# Patient Record
Sex: FEMALE | Employment: OTHER | URBAN - NONMETROPOLITAN AREA
[De-identification: names, ages, dates, MRNs, and addresses within clinical notes are randomized per-mention and may not be internally consistent; named-entity substitution may affect disease eponyms.]

---

## 2017-01-01 ENCOUNTER — MED REC SCAN ONLY (OUTPATIENT)
Dept: FAMILY MEDICINE CLINIC | Facility: CLINIC | Age: 82
End: 2017-01-01

## 2017-01-01 ENCOUNTER — TELEPHONE (OUTPATIENT)
Dept: FAMILY MEDICINE CLINIC | Facility: CLINIC | Age: 82
End: 2017-01-01

## 2017-01-01 ENCOUNTER — LABORATORY ENCOUNTER (OUTPATIENT)
Dept: LAB | Age: 82
End: 2017-01-01
Attending: FAMILY MEDICINE
Payer: MEDICARE

## 2017-01-01 ENCOUNTER — LAB REQUISITION (OUTPATIENT)
Dept: LAB | Facility: HOSPITAL | Age: 82
End: 2017-01-01
Payer: COMMERCIAL

## 2017-01-01 ENCOUNTER — LAB ENCOUNTER (OUTPATIENT)
Dept: LAB | Age: 82
End: 2017-01-01
Attending: FAMILY MEDICINE
Payer: MEDICARE

## 2017-01-01 ENCOUNTER — OFFICE VISIT (OUTPATIENT)
Dept: FAMILY MEDICINE CLINIC | Facility: CLINIC | Age: 82
End: 2017-01-01

## 2017-01-01 VITALS
WEIGHT: 88.25 LBS | HEART RATE: 100 BPM | BODY MASS INDEX: 16 KG/M2 | TEMPERATURE: 98 F | DIASTOLIC BLOOD PRESSURE: 60 MMHG | SYSTOLIC BLOOD PRESSURE: 130 MMHG | RESPIRATION RATE: 16 BRPM

## 2017-01-01 DIAGNOSIS — Z86.2 HISTORY OF ANEMIA: ICD-10-CM

## 2017-01-01 DIAGNOSIS — E87.6 HYPOKALEMIA: ICD-10-CM

## 2017-01-01 DIAGNOSIS — R73.9 HYPERGLYCEMIA: Primary | ICD-10-CM

## 2017-01-01 DIAGNOSIS — D64.9 ANEMIA, UNSPECIFIED TYPE: Primary | ICD-10-CM

## 2017-01-01 DIAGNOSIS — E03.2 HYPOTHYROIDISM DUE TO MEDICATION: Primary | ICD-10-CM

## 2017-01-01 DIAGNOSIS — S41.112D SKIN TEAR OF LEFT UPPER ARM WITHOUT COMPLICATION, SUBSEQUENT ENCOUNTER: ICD-10-CM

## 2017-01-01 DIAGNOSIS — I10 ESSENTIAL HYPERTENSION: ICD-10-CM

## 2017-01-01 DIAGNOSIS — E03.2 HYPOTHYROIDISM DUE TO MEDICATION: ICD-10-CM

## 2017-01-01 DIAGNOSIS — Z51.81 ENCOUNTER FOR MONITORING DIURETIC THERAPY: Primary | ICD-10-CM

## 2017-01-01 DIAGNOSIS — I87.2 VENOUS INSUFFICIENCY OF BOTH LOWER EXTREMITIES: ICD-10-CM

## 2017-01-01 DIAGNOSIS — E87.6 HYPOKALEMIA DUE TO LOSS OF POTASSIUM: ICD-10-CM

## 2017-01-01 DIAGNOSIS — N18.30 CKD (CHRONIC KIDNEY DISEASE) STAGE 3, GFR 30-59 ML/MIN (HCC): ICD-10-CM

## 2017-01-01 DIAGNOSIS — E78.5 HYPERLIPIDEMIA, UNSPECIFIED HYPERLIPIDEMIA TYPE: ICD-10-CM

## 2017-01-01 DIAGNOSIS — E03.9 HYPOTHYROIDISM, UNSPECIFIED TYPE: ICD-10-CM

## 2017-01-01 DIAGNOSIS — R79.9 ABNORMAL BLOOD CHEMISTRY: ICD-10-CM

## 2017-01-01 DIAGNOSIS — R73.9 HYPERGLYCEMIA: ICD-10-CM

## 2017-01-01 DIAGNOSIS — M62.81 MUSCLE WEAKNESS (GENERALIZED): ICD-10-CM

## 2017-01-01 DIAGNOSIS — Z79.899 ENCOUNTER FOR MONITORING DIURETIC THERAPY: Primary | ICD-10-CM

## 2017-01-01 DIAGNOSIS — Z86.39 HISTORY OF UNDERACTIVE THYROID: ICD-10-CM

## 2017-01-01 DIAGNOSIS — I50.22 CHRONIC SYSTOLIC CONGESTIVE HEART FAILURE (HCC): ICD-10-CM

## 2017-01-01 DIAGNOSIS — N18.4 CKD (CHRONIC KIDNEY DISEASE) STAGE 4, GFR 15-29 ML/MIN (HCC): ICD-10-CM

## 2017-01-01 DIAGNOSIS — I48.91 ATRIAL FIBRILLATION, CONTROLLED (HCC): Primary | ICD-10-CM

## 2017-01-01 DIAGNOSIS — E78.49 OTHER HYPERLIPIDEMIA: ICD-10-CM

## 2017-01-01 LAB
ALBUMIN SERPL-MCNC: 3 G/DL (ref 3.5–4.8)
ALP LIVER SERPL-CCNC: 100 U/L (ref 55–142)
ALT SERPL-CCNC: 44 U/L (ref 14–54)
AST SERPL-CCNC: 40 U/L (ref 15–41)
BASOPHILS # BLD AUTO: 0.05 X10(3) UL (ref 0–0.1)
BASOPHILS NFR BLD AUTO: 0.3 %
BILIRUB SERPL-MCNC: 1.3 MG/DL (ref 0.1–2)
BUN BLD-MCNC: 37 MG/DL (ref 8–20)
CALCIUM BLD-MCNC: 8.5 MG/DL (ref 8.3–10.3)
CHLORIDE: 92 MMOL/L (ref 101–111)
CO2: 41 MMOL/L (ref 22–32)
CREAT BLD-MCNC: 1.54 MG/DL (ref 0.55–1.02)
EOSINOPHIL # BLD AUTO: 0.01 X10(3) UL (ref 0–0.3)
EOSINOPHIL NFR BLD AUTO: 0.1 %
ERYTHROCYTE [DISTWIDTH] IN BLOOD BY AUTOMATED COUNT: 17.2 % (ref 11.5–16)
GLUCOSE BLD-MCNC: 108 MG/DL (ref 70–99)
HCT VFR BLD AUTO: 26.7 % (ref 34–50)
HGB BLD-MCNC: 8.3 G/DL (ref 12–16)
IMMATURE GRANULOCYTE COUNT: 0.07 X10(3) UL (ref 0–1)
IMMATURE GRANULOCYTE RATIO %: 0.5 %
LYMPHOCYTES # BLD AUTO: 0.65 X10(3) UL (ref 0.9–4)
LYMPHOCYTES NFR BLD AUTO: 4.3 %
M PROTEIN MFR SERPL ELPH: 6.3 G/DL (ref 6.1–8.3)
MCH RBC QN AUTO: 24.4 PG (ref 27–33.2)
MCHC RBC AUTO-ENTMCNC: 31.1 G/DL (ref 31–37)
MCV RBC AUTO: 78.5 FL (ref 81–100)
MONOCYTES # BLD AUTO: 1.23 X10(3) UL (ref 0.1–0.6)
MONOCYTES NFR BLD AUTO: 8.2 %
NEUTROPHIL ABS PRELIM: 13 X10 (3) UL (ref 1.3–6.7)
NEUTROPHILS # BLD AUTO: 13 X10(3) UL (ref 1.3–6.7)
NEUTROPHILS NFR BLD AUTO: 86.6 %
PLATELET # BLD AUTO: 285 10(3)UL (ref 150–450)
POTASSIUM SERPL-SCNC: 3.7 MMOL/L (ref 3.6–5.1)
RBC # BLD AUTO: 3.4 X10(6)UL (ref 3.8–5.1)
RED CELL DISTRIBUTION WIDTH-SD: 48.4 FL (ref 35.1–46.3)
SODIUM SERPL-SCNC: 136 MMOL/L (ref 136–144)
WBC # BLD AUTO: 15 X10(3) UL (ref 4–13)

## 2017-01-01 PROCEDURE — 83036 HEMOGLOBIN GLYCOSYLATED A1C: CPT

## 2017-01-01 PROCEDURE — 85025 COMPLETE CBC W/AUTO DIFF WBC: CPT

## 2017-01-01 PROCEDURE — G0008 ADMIN INFLUENZA VIRUS VAC: HCPCS | Performed by: FAMILY MEDICINE

## 2017-01-01 PROCEDURE — 83735 ASSAY OF MAGNESIUM: CPT

## 2017-01-01 PROCEDURE — 99214 OFFICE O/P EST MOD 30 MIN: CPT | Performed by: FAMILY MEDICINE

## 2017-01-01 PROCEDURE — 84443 ASSAY THYROID STIM HORMONE: CPT

## 2017-01-01 PROCEDURE — 80048 BASIC METABOLIC PNL TOTAL CA: CPT

## 2017-01-01 PROCEDURE — 80053 COMPREHEN METABOLIC PANEL: CPT

## 2017-01-01 PROCEDURE — 36415 COLL VENOUS BLD VENIPUNCTURE: CPT

## 2017-01-01 PROCEDURE — 80061 LIPID PANEL: CPT

## 2017-01-01 PROCEDURE — 90653 IIV ADJUVANT VACCINE IM: CPT | Performed by: FAMILY MEDICINE

## 2017-01-01 RX ORDER — LEVOTHYROXINE SODIUM 0.05 MG/1
TABLET ORAL
Qty: 30 TABLET | Refills: 0 | OUTPATIENT
Start: 2017-01-01

## 2017-01-01 RX ORDER — LEVOTHYROXINE SODIUM 0.07 MG/1
75 TABLET ORAL
Qty: 90 TABLET | Refills: 0 | Status: SHIPPED | OUTPATIENT
Start: 2017-01-01 | End: 2017-01-01

## 2017-01-01 RX ORDER — LEVOTHYROXINE SODIUM 0.07 MG/1
TABLET ORAL
Qty: 90 TABLET | Refills: 0 | Status: SHIPPED | OUTPATIENT
Start: 2017-01-01

## 2017-01-01 RX ORDER — ARIPIPRAZOLE 15 MG/1
TABLET ORAL
Qty: 1350 ML | Refills: 0 | Status: SHIPPED | OUTPATIENT
Start: 2017-01-01 | End: 2018-01-01 | Stop reason: DRUGHIGH

## 2017-01-01 RX ORDER — ARIPIPRAZOLE 15 MG/1
20 TABLET ORAL 3 TIMES DAILY
Qty: 1350 ML | Refills: 1 | OUTPATIENT
Start: 2017-01-01 | End: 2017-01-01

## 2017-01-01 RX ORDER — ARIPIPRAZOLE 15 MG/1
20 TABLET ORAL DAILY
Qty: 450 ML | Refills: 0 | OUTPATIENT
Start: 2017-01-01 | End: 2017-01-01

## 2017-01-01 RX ORDER — AMLODIPINE BESYLATE 10 MG/1
TABLET ORAL
Qty: 90 TABLET | Refills: 0 | Status: SHIPPED | OUTPATIENT
Start: 2017-01-01 | End: 2018-01-01

## 2017-01-01 RX ORDER — AMIODARONE HYDROCHLORIDE 100 MG/1
100 TABLET ORAL DAILY
COMMUNITY
End: 2017-01-01 | Stop reason: ALTCHOICE

## 2017-01-01 RX ORDER — POTASSIUM CHLORIDE 1500 MG/1
TABLET, FILM COATED, EXTENDED RELEASE ORAL
Qty: 90 TABLET | Refills: 0 | Status: SHIPPED | OUTPATIENT
Start: 2017-01-01 | End: 2017-01-01 | Stop reason: ALTCHOICE

## 2017-01-01 RX ORDER — ATORVASTATIN CALCIUM 40 MG/1
TABLET, FILM COATED ORAL
Qty: 90 TABLET | Refills: 0 | Status: SHIPPED | OUTPATIENT
Start: 2017-01-01 | End: 2018-01-01

## 2017-01-01 RX ORDER — OMEPRAZOLE 20 MG/1
CAPSULE, DELAYED RELEASE ORAL
Qty: 90 CAPSULE | Refills: 0 | Status: SHIPPED | OUTPATIENT
Start: 2017-01-01 | End: 2017-01-01

## 2017-01-01 RX ORDER — ARIPIPRAZOLE 15 MG/1
TABLET ORAL
Qty: 1350 ML | Refills: 0 | Status: SHIPPED | OUTPATIENT
Start: 2017-01-01 | End: 2017-01-01

## 2017-01-01 RX ORDER — ALPRAZOLAM 0.25 MG/1
TABLET ORAL
Refills: 2 | COMMUNITY
Start: 2017-01-01 | End: 2018-01-01

## 2017-01-01 RX ORDER — OMEPRAZOLE 20 MG/1
CAPSULE, DELAYED RELEASE ORAL
Qty: 90 CAPSULE | Refills: 0 | Status: SHIPPED | OUTPATIENT
Start: 2017-01-01

## 2017-01-04 ENCOUNTER — PATIENT OUTREACH (OUTPATIENT)
Dept: FAMILY MEDICINE CLINIC | Facility: CLINIC | Age: 82
End: 2017-01-04

## 2017-01-05 ENCOUNTER — TELEPHONE (OUTPATIENT)
Dept: FAMILY MEDICINE CLINIC | Facility: CLINIC | Age: 82
End: 2017-01-05

## 2017-01-05 NOTE — TELEPHONE ENCOUNTER
Spoke to Karolyn Hardin at Dr Yola Cannon and they will order amiodarone, zaroxylin, and potassium.   Pita King is seeing Dr Ronit Smallwood [facility] 11th.  katie/cj

## 2017-01-13 ENCOUNTER — OFFICE VISIT (OUTPATIENT)
Dept: FAMILY MEDICINE CLINIC | Facility: CLINIC | Age: 82
End: 2017-01-13

## 2017-01-13 VITALS
TEMPERATURE: 98 F | WEIGHT: 96.5 LBS | DIASTOLIC BLOOD PRESSURE: 66 MMHG | BODY MASS INDEX: 17.76 KG/M2 | SYSTOLIC BLOOD PRESSURE: 130 MMHG | HEIGHT: 62 IN

## 2017-01-13 DIAGNOSIS — IMO0001: ICD-10-CM

## 2017-01-13 DIAGNOSIS — I48.91 ATRIAL FIBRILLATION, CONTROLLED (HCC): ICD-10-CM

## 2017-01-13 DIAGNOSIS — Z98.890 S/P KYPHOPLASTY: ICD-10-CM

## 2017-01-13 DIAGNOSIS — I10 ESSENTIAL HYPERTENSION: Primary | ICD-10-CM

## 2017-01-13 DIAGNOSIS — N18.30 CKD (CHRONIC KIDNEY DISEASE) STAGE 3, GFR 30-59 ML/MIN (HCC): ICD-10-CM

## 2017-01-13 PROCEDURE — 99214 OFFICE O/P EST MOD 30 MIN: CPT | Performed by: FAMILY MEDICINE

## 2017-01-13 NOTE — PROGRESS NOTES
HPI:    Patient ID: Gilles Pedraza is a 80year old female. Pt saw cardiologist yest  Breathing OK  Walker  Pain - dull ache - OK    HPI    Review of Systems   Respiratory: Negative for cough, shortness of breath and wheezing.     Cardiovascular: Positive f Allergies:  Bactrim [Sulfametho*    Other (See Comments)    Comment:Muscle weakness, fatigue, off balance, loss of             appetite   PHYSICAL EXAM:   Physical Exam   Constitutional: She is oriented to person, place, and time.  She appears well-deve

## 2017-01-27 ENCOUNTER — TELEPHONE (OUTPATIENT)
Dept: FAMILY MEDICINE CLINIC | Facility: CLINIC | Age: 82
End: 2017-01-27

## 2017-01-30 ENCOUNTER — TELEPHONE (OUTPATIENT)
Dept: FAMILY MEDICINE CLINIC | Facility: CLINIC | Age: 82
End: 2017-01-30

## 2017-01-30 DIAGNOSIS — M54.9 BACK PAIN, UNSPECIFIED BACK LOCATION, UNSPECIFIED BACK PAIN LATERALITY, UNSPECIFIED CHRONICITY: ICD-10-CM

## 2017-01-30 DIAGNOSIS — M81.0 OSTEOPOROSIS: Primary | ICD-10-CM

## 2017-01-30 DIAGNOSIS — Z98.890 S/P KYPHOPLASTY: ICD-10-CM

## 2017-01-30 NOTE — TELEPHONE ENCOUNTER
Joann Joya is more hunched over and c/o increased pain.   Michele discussed with radiologist and thought she needs an MRI; told grandaughter to check with Dr Adaligsa Cifuentes for order

## 2017-01-30 NOTE — TELEPHONE ENCOUNTER
Refaxed that can also go to [facility] for MRI. Checked with suppliment, UMR, and no PA needed for MRI.  Via their automated line

## 2017-01-30 NOTE — TELEPHONE ENCOUNTER
----- Message from [facility] sent at 1/30/2017  2:26 PM CST -----  Julianna Narayan at [facility]     . Rhonda Tobar RETURNED [address]     Call her at [phone] x [phone]

## 2017-01-30 NOTE — TELEPHONE ENCOUNTER
Dr Yunior El will order; but also need to get established with [facility] pain center for chronic pain management. She wants the radiologist to do the kyphoplasty; not pain doctor. Told her that is fine; but still needs chronic pain management.

## 2017-01-31 ENCOUNTER — TELEPHONE (OUTPATIENT)
Dept: FAMILY MEDICINE CLINIC | Facility: CLINIC | Age: 82
End: 2017-01-31

## 2017-02-01 ENCOUNTER — TELEPHONE (OUTPATIENT)
Dept: FAMILY MEDICINE CLINIC | Facility: CLINIC | Age: 82
End: 2017-02-01

## 2017-02-01 NOTE — TELEPHONE ENCOUNTER
Order printed, manually signed by physician, and faxed to [facility].

## 2017-02-03 ENCOUNTER — TELEPHONE (OUTPATIENT)
Dept: FAMILY MEDICINE CLINIC | Facility: CLINIC | Age: 82
End: 2017-02-03

## 2017-02-03 NOTE — TELEPHONE ENCOUNTER
Per Dr Rosa Maria Weller- MRI of thoracic spine w/o contrast - unremarkable; recommend pain clinic. Twin Ezekiel will wait until lumbar mri done, and radiologist reviewa; and if no fractures will then make appt with [facility].       Claudia comments it is the b

## 2017-02-08 ENCOUNTER — TELEPHONE (OUTPATIENT)
Dept: FAMILY MEDICINE CLINIC | Facility: CLINIC | Age: 82
End: 2017-02-08

## 2017-02-09 ENCOUNTER — TELEPHONE (OUTPATIENT)
Dept: FAMILY MEDICINE CLINIC | Facility: CLINIC | Age: 82
End: 2017-02-09

## 2017-02-10 ENCOUNTER — MED REC SCAN ONLY (OUTPATIENT)
Dept: FAMILY MEDICINE CLINIC | Facility: CLINIC | Age: 82
End: 2017-02-10

## 2017-02-10 NOTE — TELEPHONE ENCOUNTER
Spoke with Gómez lucas, and she is already aware of addendum report on MRI T12 now being read as questionable compression fracture. Darío Whitehead is seeing Dr Melania Joya.  Rubi Richardson, radiologist who does kyphplasty on Weds 2/15/2017, to evaluate for another p

## 2017-02-13 ENCOUNTER — TELEPHONE (OUTPATIENT)
Dept: FAMILY MEDICINE CLINIC | Facility: CLINIC | Age: 82
End: 2017-02-13

## 2017-02-13 DIAGNOSIS — Z98.890 S/P KYPHOPLASTY: ICD-10-CM

## 2017-02-13 DIAGNOSIS — M81.0 OSTEOPOROSIS: Primary | ICD-10-CM

## 2017-02-13 DIAGNOSIS — M54.9 BACK PAIN, UNSPECIFIED BACK LOCATION, UNSPECIFIED BACK PAIN LATERALITY, UNSPECIFIED CHRONICITY: ICD-10-CM

## 2017-02-13 NOTE — TELEPHONE ENCOUNTER
need referral to see interventional radiologist at [facility]; appt weds for eval.  Referral needed for Dr Frank Colunga. Referral faxed to Evangelista Addison at [phone].   katie/cj

## 2017-02-16 ENCOUNTER — TELEPHONE (OUTPATIENT)
Dept: FAMILY MEDICINE CLINIC | Facility: CLINIC | Age: 82
End: 2017-02-16

## 2017-02-16 NOTE — TELEPHONE ENCOUNTER
OV note faxed from 1/13/2017; presently due for fasting labs, scheduled with Dr Trevin Shetty 3/6/2017 with labs. Called [facility] and they will do a CBC, and PT/INR on admission. She will call Dr Chica Gregory for further records.   ej/cj

## 2017-03-06 ENCOUNTER — OFFICE VISIT (OUTPATIENT)
Dept: FAMILY MEDICINE CLINIC | Facility: CLINIC | Age: 82
End: 2017-03-06

## 2017-03-06 ENCOUNTER — TELEPHONE (OUTPATIENT)
Dept: FAMILY MEDICINE CLINIC | Facility: CLINIC | Age: 82
End: 2017-03-06

## 2017-03-06 VITALS
HEART RATE: 100 BPM | TEMPERATURE: 98 F | RESPIRATION RATE: 20 BRPM | SYSTOLIC BLOOD PRESSURE: 130 MMHG | BODY MASS INDEX: 18 KG/M2 | DIASTOLIC BLOOD PRESSURE: 76 MMHG | WEIGHT: 100.38 LBS

## 2017-03-06 DIAGNOSIS — E78.5 HYPERLIPIDEMIA, UNSPECIFIED HYPERLIPIDEMIA TYPE: ICD-10-CM

## 2017-03-06 DIAGNOSIS — N18.30 CKD (CHRONIC KIDNEY DISEASE) STAGE 3, GFR 30-59 ML/MIN (HCC): ICD-10-CM

## 2017-03-06 DIAGNOSIS — R73.9 HYPERGLYCEMIA: ICD-10-CM

## 2017-03-06 DIAGNOSIS — D23.62: ICD-10-CM

## 2017-03-06 DIAGNOSIS — I10 ESSENTIAL HYPERTENSION: ICD-10-CM

## 2017-03-06 DIAGNOSIS — I48.91 ATRIAL FIBRILLATION, CONTROLLED (HCC): Primary | ICD-10-CM

## 2017-03-06 DIAGNOSIS — I87.2 VENOUS INSUFFICIENCY OF BOTH LOWER EXTREMITIES: ICD-10-CM

## 2017-03-06 LAB
ALBUMIN SERPL-MCNC: 3.4 G/DL (ref 3.5–4.8)
ALP LIVER SERPL-CCNC: 124 U/L (ref 55–142)
ALT SERPL-CCNC: 14 U/L (ref 14–54)
AST SERPL-CCNC: 14 U/L (ref 15–41)
BILIRUB SERPL-MCNC: 0.7 MG/DL (ref 0.1–2)
BUN BLD-MCNC: 24 MG/DL (ref 8–20)
CALCIUM BLD-MCNC: 9.6 MG/DL (ref 8.3–10.3)
CHLORIDE: 101 MMOL/L (ref 101–111)
CHOLEST SMN-MCNC: 216 MG/DL (ref ?–200)
CO2: 33 MMOL/L (ref 22–32)
CREAT BLD-MCNC: 1.36 MG/DL (ref 0.55–1.02)
EST. AVERAGE GLUCOSE BLD GHB EST-MCNC: 117 MG/DL (ref 68–126)
GLUCOSE BLD-MCNC: 106 MG/DL (ref 70–99)
HBA1C MFR BLD HPLC: 5.7 % (ref ?–5.7)
HDLC SERPL-MCNC: 117 MG/DL (ref 45–?)
HDLC SERPL: 1.85 {RATIO} (ref ?–4.44)
LDLC SERPL CALC-MCNC: 85 MG/DL (ref ?–130)
M PROTEIN MFR SERPL ELPH: 7.2 G/DL (ref 6.1–8.3)
NONHDLC SERPL-MCNC: 99 MG/DL (ref ?–130)
POTASSIUM SERPL-SCNC: 3.4 MMOL/L (ref 3.6–5.1)
SODIUM SERPL-SCNC: 142 MMOL/L (ref 136–144)
TRIGLYCERIDES: 69 MG/DL (ref ?–150)
VLDL: 14 MG/DL (ref 5–40)

## 2017-03-06 PROCEDURE — 80053 COMPREHEN METABOLIC PANEL: CPT | Performed by: FAMILY MEDICINE

## 2017-03-06 PROCEDURE — 83036 HEMOGLOBIN GLYCOSYLATED A1C: CPT | Performed by: FAMILY MEDICINE

## 2017-03-06 PROCEDURE — 80061 LIPID PANEL: CPT | Performed by: FAMILY MEDICINE

## 2017-03-06 PROCEDURE — 99214 OFFICE O/P EST MOD 30 MIN: CPT | Performed by: FAMILY MEDICINE

## 2017-03-06 RX ORDER — AMIODARONE HYDROCHLORIDE 200 MG/1
TABLET ORAL
COMMUNITY
Start: 2017-01-30 | End: 2017-03-06 | Stop reason: ALTCHOICE

## 2017-03-06 RX ORDER — OMEGA-3 FATTY ACIDS/FISH OIL 300-1000MG
CAPSULE ORAL
COMMUNITY
End: 2018-01-01

## 2017-03-06 NOTE — PROGRESS NOTES
HPI:    Patient ID: Abdullahi Lemon is a 80year old female. Patient states she occasionally feels irregular heartbeat. Concerned with swelling to lower legs. Without treatment. Back pain improved status post kyphoplasty. Breathing okay. Appetite okay. exhibits edema (.  Knee, thick edema bilateral lower extremities. Positive venous stasis changes. ). Neurological: She is alert and oriented to person, place, and time. Skin: Skin is warm and dry. No erythema. Thick nodular lesion to elbow.   Rule out

## 2017-03-06 NOTE — TELEPHONE ENCOUNTER
Pt instructed to make appt with dermatologist for removal of skin lesion on left elbow, looks like skin cancer to Dr Jose L Paul on picture. Pt expresses understanding.

## 2017-03-06 NOTE — PATIENT INSTRUCTIONS
Marquis hose - knee high / massage feet / lower legs  F/u Cardiology as directed  Recommend patient follow-up in 2-3 weeks. Recommend patient follow-up with dermatology for lesion on elbow. Call with questions or problems.   Continue other medications as orde

## 2017-03-31 RX ORDER — OMEPRAZOLE 20 MG/1
CAPSULE, DELAYED RELEASE ORAL
Qty: 90 CAPSULE | Refills: 0 | Status: SHIPPED | OUTPATIENT
Start: 2017-03-31 | End: 2017-06-26

## 2017-04-07 ENCOUNTER — MED REC SCAN ONLY (OUTPATIENT)
Dept: FAMILY MEDICINE CLINIC | Facility: CLINIC | Age: 82
End: 2017-04-07

## 2017-04-11 ENCOUNTER — TELEPHONE (OUTPATIENT)
Dept: FAMILY MEDICINE CLINIC | Facility: CLINIC | Age: 82
End: 2017-04-11

## 2017-04-11 NOTE — TELEPHONE ENCOUNTER
patient being seen on Wednesday, wanted to make sure Dr was made aware of possible Reflux symptoms,  patient is having increased heartburn, hoarseness at back of throat,     Update routed to Dr Ezio Sanford.

## 2017-04-12 ENCOUNTER — OFFICE VISIT (OUTPATIENT)
Dept: FAMILY MEDICINE CLINIC | Facility: CLINIC | Age: 82
End: 2017-04-12

## 2017-04-12 VITALS
WEIGHT: 90.5 LBS | OXYGEN SATURATION: 98 % | HEIGHT: 62 IN | HEART RATE: 67 BPM | DIASTOLIC BLOOD PRESSURE: 60 MMHG | TEMPERATURE: 97 F | BODY MASS INDEX: 16.65 KG/M2 | SYSTOLIC BLOOD PRESSURE: 122 MMHG

## 2017-04-12 DIAGNOSIS — I48.91 ATRIAL FIBRILLATION, CONTROLLED (HCC): ICD-10-CM

## 2017-04-12 DIAGNOSIS — K21.9 GASTROESOPHAGEAL REFLUX DISEASE WITHOUT ESOPHAGITIS: ICD-10-CM

## 2017-04-12 DIAGNOSIS — I10 ESSENTIAL HYPERTENSION: Primary | ICD-10-CM

## 2017-04-12 DIAGNOSIS — I87.2 VENOUS INSUFFICIENCY OF BOTH LOWER EXTREMITIES: ICD-10-CM

## 2017-04-12 PROCEDURE — 99214 OFFICE O/P EST MOD 30 MIN: CPT | Performed by: FAMILY MEDICINE

## 2017-04-12 RX ORDER — POTASSIUM CHLORIDE 1500 MG/1
TABLET, FILM COATED, EXTENDED RELEASE ORAL
Refills: 3 | COMMUNITY
Start: 2017-03-25 | End: 2017-07-05

## 2017-04-12 NOTE — PROGRESS NOTES
HPI:    Patient ID: Paty Swain is a 80year old female. Recent hospital - SOB - [facility]  + palp  Breathing OK now  Saw Cardiology .   GERD - improving per pt - occas thick in throat in AM  HPI    Review of Systems   Respiratory: Negative for cough and shor wheezes. She has no rales. Musculoskeletal: She exhibits no edema. Neurological: She is alert and oriented to person, place, and time. Skin: Skin is warm and dry. Vitals reviewed.              ASSESSMENT/PLAN:   Essential hypertension  (primary enco

## 2017-04-18 ENCOUNTER — TELEPHONE (OUTPATIENT)
Dept: FAMILY MEDICINE CLINIC | Facility: CLINIC | Age: 82
End: 2017-04-18

## 2017-04-18 DIAGNOSIS — E78.49 OTHER HYPERLIPIDEMIA: ICD-10-CM

## 2017-04-18 DIAGNOSIS — I10 ESSENTIAL HYPERTENSION: Primary | ICD-10-CM

## 2017-04-18 RX ORDER — ATORVASTATIN CALCIUM 40 MG/1
TABLET, FILM COATED ORAL
Qty: 90 TABLET | Refills: 0 | Status: SHIPPED | OUTPATIENT
Start: 2017-04-18 | End: 2017-07-14

## 2017-04-18 RX ORDER — AMLODIPINE BESYLATE 10 MG/1
TABLET ORAL
Qty: 90 TABLET | Refills: 0 | Status: SHIPPED | OUTPATIENT
Start: 2017-04-18 | End: 2017-07-14

## 2017-04-18 RX ORDER — FUROSEMIDE 20 MG/1
TABLET ORAL
Refills: 0 | COMMUNITY
Start: 2017-03-23

## 2017-04-18 RX ORDER — FERROUS SULFATE TAB EC 324 MG (65 MG FE EQUIVALENT) 324 (65 FE) MG
TABLET DELAYED RESPONSE ORAL
Refills: 0 | COMMUNITY
Start: 2017-03-23

## 2017-04-18 NOTE — TELEPHONE ENCOUNTER
Pt just got out of hospital and running low on lasix. Advised- discussed needs to call Dr Kennedi Olivares, cardiologist for this script, he is managing diuretics, and potassium, since heart condition is fragile.   ej/cj

## 2017-05-25 ENCOUNTER — TELEPHONE (OUTPATIENT)
Dept: FAMILY MEDICINE CLINIC | Facility: CLINIC | Age: 82
End: 2017-05-25

## 2017-06-19 ENCOUNTER — TELEPHONE (OUTPATIENT)
Dept: FAMILY MEDICINE CLINIC | Facility: CLINIC | Age: 82
End: 2017-06-19

## 2017-06-19 NOTE — TELEPHONE ENCOUNTER
Call cardiologist and notify that this continues to happen. V/o Dr. Riojas Route. Patient advised and verbalizes understanding.

## 2017-06-26 RX ORDER — OMEPRAZOLE 20 MG/1
CAPSULE, DELAYED RELEASE ORAL
Qty: 90 CAPSULE | Refills: 0 | Status: SHIPPED | OUTPATIENT
Start: 2017-06-26 | End: 2017-01-01

## 2017-07-03 ENCOUNTER — LAB ENCOUNTER (OUTPATIENT)
Dept: LAB | Age: 82
End: 2017-07-03
Attending: FAMILY MEDICINE
Payer: MEDICARE

## 2017-07-03 ENCOUNTER — OFFICE VISIT (OUTPATIENT)
Dept: FAMILY MEDICINE CLINIC | Facility: CLINIC | Age: 82
End: 2017-07-03

## 2017-07-03 VITALS
SYSTOLIC BLOOD PRESSURE: 130 MMHG | DIASTOLIC BLOOD PRESSURE: 70 MMHG | OXYGEN SATURATION: 96 % | BODY MASS INDEX: 16.08 KG/M2 | WEIGHT: 87.38 LBS | TEMPERATURE: 99 F | RESPIRATION RATE: 16 BRPM | HEART RATE: 88 BPM | HEIGHT: 62 IN

## 2017-07-03 DIAGNOSIS — R73.9 HYPERGLYCEMIA: ICD-10-CM

## 2017-07-03 DIAGNOSIS — I10 ESSENTIAL HYPERTENSION: ICD-10-CM

## 2017-07-03 DIAGNOSIS — N18.30 CKD (CHRONIC KIDNEY DISEASE) STAGE 3, GFR 30-59 ML/MIN (HCC): ICD-10-CM

## 2017-07-03 DIAGNOSIS — Z23 NEED FOR VACCINATION: ICD-10-CM

## 2017-07-03 DIAGNOSIS — I87.2 VENOUS INSUFFICIENCY OF BOTH LOWER EXTREMITIES: ICD-10-CM

## 2017-07-03 DIAGNOSIS — Z13.31 DEPRESSION SCREENING: ICD-10-CM

## 2017-07-03 DIAGNOSIS — I48.91 ATRIAL FIBRILLATION, CONTROLLED (HCC): ICD-10-CM

## 2017-07-03 DIAGNOSIS — E78.5 HYPERLIPIDEMIA, UNSPECIFIED HYPERLIPIDEMIA TYPE: ICD-10-CM

## 2017-07-03 DIAGNOSIS — Z86.2 HISTORY OF ANEMIA: ICD-10-CM

## 2017-07-03 DIAGNOSIS — Z00.00 ENCOUNTER FOR ANNUAL HEALTH EXAMINATION: Primary | ICD-10-CM

## 2017-07-03 DIAGNOSIS — I50.20 SYSTOLIC CONGESTIVE HEART FAILURE, UNSPECIFIED CONGESTIVE HEART FAILURE CHRONICITY: ICD-10-CM

## 2017-07-03 LAB
ALBUMIN SERPL-MCNC: 3.8 G/DL (ref 3.5–4.8)
ALP LIVER SERPL-CCNC: 111 U/L (ref 55–142)
ALT SERPL-CCNC: 21 U/L (ref 14–54)
AST SERPL-CCNC: 20 U/L (ref 15–41)
BASOPHILS # BLD AUTO: 0.07 X10(3) UL (ref 0–0.1)
BASOPHILS NFR BLD AUTO: 0.7 %
BILIRUB SERPL-MCNC: 0.7 MG/DL (ref 0.1–2)
BUN BLD-MCNC: 42 MG/DL (ref 8–20)
CALCIUM BLD-MCNC: 9.7 MG/DL (ref 8.3–10.3)
CHLORIDE: 94 MMOL/L (ref 101–111)
CHOLEST SMN-MCNC: 225 MG/DL (ref ?–200)
CO2: 35 MMOL/L (ref 22–32)
CREAT BLD-MCNC: 1.8 MG/DL (ref 0.55–1.02)
CREAT UR-SCNC: 30.9 MG/DL
EOSINOPHIL # BLD AUTO: 0.08 X10(3) UL (ref 0–0.3)
EOSINOPHIL NFR BLD AUTO: 0.8 %
ERYTHROCYTE [DISTWIDTH] IN BLOOD BY AUTOMATED COUNT: 17.8 % (ref 11.5–16)
EST. AVERAGE GLUCOSE BLD GHB EST-MCNC: 126 MG/DL (ref 68–126)
GLUCOSE BLD-MCNC: 78 MG/DL (ref 70–99)
HBA1C MFR BLD HPLC: 6 % (ref ?–5.7)
HCT VFR BLD AUTO: 35.1 % (ref 34–50)
HDLC SERPL-MCNC: 108 MG/DL (ref 45–?)
HDLC SERPL: 2.08 {RATIO} (ref ?–4.44)
HGB BLD-MCNC: 11.2 G/DL (ref 12–16)
IMMATURE GRANULOCYTE COUNT: 0.04 X10(3) UL (ref 0–1)
IMMATURE GRANULOCYTE RATIO %: 0.4 %
LDLC SERPL CALC-MCNC: 102 MG/DL (ref ?–130)
LYMPHOCYTES # BLD AUTO: 1.85 X10(3) UL (ref 0.9–4)
LYMPHOCYTES NFR BLD AUTO: 18.2 %
M PROTEIN MFR SERPL ELPH: 8.1 G/DL (ref 6.1–8.3)
MCH RBC QN AUTO: 26 PG (ref 27–33.2)
MCHC RBC AUTO-ENTMCNC: 31.9 G/DL (ref 31–37)
MCV RBC AUTO: 81.4 FL (ref 81–100)
MICROALBUMIN UR-MCNC: 4.87 MG/DL
MICROALBUMIN/CREAT 24H UR-RTO: 157.6 UG/MG (ref ?–30)
MONOCYTES # BLD AUTO: 1.03 X10(3) UL (ref 0.1–0.6)
MONOCYTES NFR BLD AUTO: 10.1 %
NEUTROPHIL ABS PRELIM: 7.09 X10 (3) UL (ref 1.3–6.7)
NEUTROPHILS # BLD AUTO: 7.09 X10(3) UL (ref 1.3–6.7)
NEUTROPHILS NFR BLD AUTO: 69.8 %
NONHDLC SERPL-MCNC: 117 MG/DL (ref ?–130)
PLATELET # BLD AUTO: 373 10(3)UL (ref 150–450)
POTASSIUM SERPL-SCNC: 3.1 MMOL/L (ref 3.6–5.1)
RBC # BLD AUTO: 4.31 X10(6)UL (ref 3.8–5.1)
RED CELL DISTRIBUTION WIDTH-SD: 52.6 FL (ref 35.1–46.3)
SODIUM SERPL-SCNC: 138 MMOL/L (ref 136–144)
TRIGLYCERIDES: 74 MG/DL (ref ?–150)
TSI SER-ACNC: 48.8 MIU/ML (ref 0.35–5.5)
VLDL: 15 MG/DL (ref 5–40)
WBC # BLD AUTO: 10.2 X10(3) UL (ref 4–13)

## 2017-07-03 PROCEDURE — 82570 ASSAY OF URINE CREATININE: CPT

## 2017-07-03 PROCEDURE — G0009 ADMIN PNEUMOCOCCAL VACCINE: HCPCS | Performed by: FAMILY MEDICINE

## 2017-07-03 PROCEDURE — 36415 COLL VENOUS BLD VENIPUNCTURE: CPT

## 2017-07-03 PROCEDURE — 85025 COMPLETE CBC W/AUTO DIFF WBC: CPT

## 2017-07-03 PROCEDURE — G0439 PPPS, SUBSEQ VISIT: HCPCS | Performed by: FAMILY MEDICINE

## 2017-07-03 PROCEDURE — 90732 PPSV23 VACC 2 YRS+ SUBQ/IM: CPT | Performed by: FAMILY MEDICINE

## 2017-07-03 PROCEDURE — 99213 OFFICE O/P EST LOW 20 MIN: CPT | Performed by: FAMILY MEDICINE

## 2017-07-03 PROCEDURE — G0444 DEPRESSION SCREEN ANNUAL: HCPCS | Performed by: FAMILY MEDICINE

## 2017-07-03 PROCEDURE — 82043 UR ALBUMIN QUANTITATIVE: CPT

## 2017-07-03 PROCEDURE — 84443 ASSAY THYROID STIM HORMONE: CPT

## 2017-07-03 RX ORDER — AMIODARONE HYDROCHLORIDE 200 MG/1
1 TABLET ORAL DAILY
Refills: 2 | COMMUNITY
Start: 2017-04-28

## 2017-07-03 NOTE — PROGRESS NOTES
HPI:   Kenneth Griggs is a 80year old female who presents for a Medicare Subsequent Annual Wellness visit (Pt already had Initial Annual Wellness). C/o swelling lower ext - worse end of day. w/o Tx  Cardiologist aware.   Breathing OK  Her last annual asse TAKE ONE CAPSULE BY MOUTH EVERY MORNING   ATORVASTATIN CALCIUM 40 MG Oral Tab TAKE 1 TABLET(40 MG) BY MOUTH EVERY DAY   AMLODIPINE BESYLATE 10 MG Oral Tab TAKE 1 TABLET(10 MG) BY MOUTH DAILY   furosemide 20 MG Oral Tab TK 1 T PO D   Ferrous Sulfate 324 (96 anxiety  HEMATOLOGIC: denies hx of anemia  ENDOCRINE: denies thyroid history  ALL/ASTHMA: denies hx of allergy or asthma    EXAM:   /70   Pulse 88   Temp 98.9 °F (37.2 °C) (Temporal)   Resp 16   Ht 62\"   Wt 87 lb 6.4 oz   SpO2 96%   BMI 15.99 kg/m² 09/16/2011, 10/01/2012, 10/21/2013   • Influenza Vaccine, High Dose, Preserv Free 10/20/2014, 09/28/2016   • Pneumococcal (Prevnar 13) 07/01/2016   • Tetanus 08/01/2011       ASSESSMENT AND OTHER RELEVANT CHRONIC CONDITIONS:   Jennifer Wood is a 80 year ol Radha Pritchett does not have a Living Will on file in [address] [place] [address] Rd.  The patient has this document but we do not have it in [facility], and patient is instructed to get our office a copy of it for scanning into [address] [place] [address] on file in [facility]:    Ellen Archuleta Fall/Risk Assessment     Do you have 3 or more medical conditions?: 1-Yes    Have you fallen in the last 12 months?: 0-No    Do you accidently lose urine?: 0-No    Do you have difficulty seeing?: 0-No    Do you have any difficulty walking or getting up or if medically necessary Electrocardiogram date       Colorectal Cancer Screening      Colonoscopy Screen every 10 years Colonoscopy,10 Years due on 01/18/1979 Update Health Maintenance if applicable    Flex Sigmoidoscopy Screen every 10 years No results by Medicare Part B No vaccine history found This may be covered with your prescription benefits, but Medicare does not cover unless Medically needed    Zoster  Not covered by Medicare Part B No vaccine history found This may be covered with your pharmacy

## 2017-07-03 NOTE — PATIENT INSTRUCTIONS
Claudia Graham's SCREENING SCHEDULE   Tests on this list are recommended by your physician but may not be covered, or covered at this frequency, by your insurer. Please check with your insurance carrier before scheduling to verify coverage.    PREVENTATIVE the following criteria:   • Men who are 73-68 years old and have smoked more than 100 cigarettes in their lifetime   • Anyone with a family history    Colorectal Cancer Screening  Covered up to Age 76     Colonoscopy Screen   Covered every 10 years- more o regularly   Immunizations      Influenza  Covered Annually   Orders placed or performed in visit on 09/28/16  -FLU VACC PRSV FREE INC ANTIG   Orders placed or performed in visit on 10/06/15  -FLU VACC PRSV FREE INC ANTIG   Orders placed or performed in vis the [place]. This site has a lot of good information including definitions of the different types of Advance Directives.  It also has the State forms available on it's website for anyone to review and print using their home computer a

## 2017-07-07 ENCOUNTER — TELEPHONE (OUTPATIENT)
Dept: FAMILY MEDICINE CLINIC | Facility: CLINIC | Age: 82
End: 2017-07-07

## 2017-07-07 NOTE — TELEPHONE ENCOUNTER
She does not have enough amlodipine to get her through till 7/18/2017. Call made to [facility] and spoke to [place] about situation; he will provide 11 tablets at no charge.   Patient needs to come into store and be sure and tell them she is pickguanakito

## 2017-07-07 NOTE — TELEPHONE ENCOUNTER
Reviewed that levothyroxin is a new medication. Discussed to take first thing in the morning on empty stomach 1 hr prior to breakfast.  Expresses understanding.

## 2017-07-07 NOTE — TELEPHONE ENCOUNTER
Pt picked up levothyroxin at [facility], she has not had this medicine before, call Margarito Call

## 2017-07-12 ENCOUNTER — TELEPHONE (OUTPATIENT)
Dept: FAMILY MEDICINE CLINIC | Facility: CLINIC | Age: 82
End: 2017-07-12

## 2017-07-13 RX ORDER — POTASSIUM CHLORIDE 1500 MG/1
1 TABLET, FILM COATED, EXTENDED RELEASE ORAL 2 TIMES DAILY WITH MEALS
Qty: 60 TABLET | Refills: 0 | Status: SHIPPED | OUTPATIENT
Start: 2017-07-13 | End: 2017-08-03

## 2017-07-13 NOTE — TELEPHONE ENCOUNTER
Beginning of July Dr Nikko Flannery increased potassium to BID; now she is out because last script was from Dr Santi Seth, cardiologist.  Script ordered under Dr Nikko Flannery, and labs faxed to Dr Santi Seth, cardiologist.

## 2017-07-14 DIAGNOSIS — I10 ESSENTIAL HYPERTENSION: ICD-10-CM

## 2017-07-14 DIAGNOSIS — E78.49 OTHER HYPERLIPIDEMIA: ICD-10-CM

## 2017-07-14 RX ORDER — AMLODIPINE BESYLATE 10 MG/1
TABLET ORAL
Qty: 90 TABLET | Refills: 0 | Status: SHIPPED | OUTPATIENT
Start: 2017-07-14 | End: 2017-01-01

## 2017-07-14 RX ORDER — ATORVASTATIN CALCIUM 40 MG/1
TABLET, FILM COATED ORAL
Qty: 90 TABLET | Refills: 0 | Status: SHIPPED | OUTPATIENT
Start: 2017-07-14 | End: 2017-01-01

## 2017-07-19 ENCOUNTER — APPOINTMENT (OUTPATIENT)
Dept: LAB | Age: 82
End: 2017-07-19
Attending: FAMILY MEDICINE
Payer: MEDICARE

## 2017-07-19 DIAGNOSIS — I10 ESSENTIAL HYPERTENSION: ICD-10-CM

## 2017-07-19 LAB
BUN BLD-MCNC: 37 MG/DL (ref 8–20)
CALCIUM BLD-MCNC: 9.7 MG/DL (ref 8.3–10.3)
CHLORIDE: 95 MMOL/L (ref 101–111)
CO2: 35 MMOL/L (ref 22–32)
CREAT BLD-MCNC: 1.54 MG/DL (ref 0.55–1.02)
GLUCOSE BLD-MCNC: 142 MG/DL (ref 70–99)
HAV IGM SER QL: 2.2 MG/DL (ref 1.7–3)
POTASSIUM SERPL-SCNC: 3.1 MMOL/L (ref 3.6–5.1)
SODIUM SERPL-SCNC: 135 MMOL/L (ref 136–144)

## 2017-07-19 PROCEDURE — 80048 BASIC METABOLIC PNL TOTAL CA: CPT

## 2017-07-19 PROCEDURE — 36415 COLL VENOUS BLD VENIPUNCTURE: CPT

## 2017-07-19 PROCEDURE — 83735 ASSAY OF MAGNESIUM: CPT

## 2017-08-02 ENCOUNTER — APPOINTMENT (OUTPATIENT)
Dept: LAB | Age: 82
End: 2017-08-02
Attending: FAMILY MEDICINE
Payer: MEDICARE

## 2017-08-02 DIAGNOSIS — E87.6 POTASSIUM SERUM DECREASED: ICD-10-CM

## 2017-08-02 LAB
BUN BLD-MCNC: 30 MG/DL (ref 8–20)
CALCIUM BLD-MCNC: 9.8 MG/DL (ref 8.3–10.3)
CHLORIDE: 94 MMOL/L (ref 101–111)
CO2: 32 MMOL/L (ref 22–32)
CREAT BLD-MCNC: 1.42 MG/DL (ref 0.55–1.02)
GLUCOSE BLD-MCNC: 99 MG/DL (ref 70–99)
POTASSIUM SERPL-SCNC: 3.5 MMOL/L (ref 3.6–5.1)
SODIUM SERPL-SCNC: 134 MMOL/L (ref 136–144)

## 2017-08-02 PROCEDURE — 36415 COLL VENOUS BLD VENIPUNCTURE: CPT

## 2017-08-02 PROCEDURE — 80048 BASIC METABOLIC PNL TOTAL CA: CPT

## 2017-08-03 DIAGNOSIS — E87.6 HYPOKALEMIA: Primary | ICD-10-CM

## 2017-08-03 RX ORDER — POTASSIUM CHLORIDE 1500 MG/1
1 TABLET, FILM COATED, EXTENDED RELEASE ORAL
Qty: 90 TABLET | Refills: 1 | Status: SHIPPED | OUTPATIENT
Start: 2017-08-03 | End: 2017-01-01

## 2017-08-07 ENCOUNTER — OFFICE VISIT (OUTPATIENT)
Dept: FAMILY MEDICINE CLINIC | Facility: CLINIC | Age: 82
End: 2017-08-07

## 2017-08-07 VITALS
SYSTOLIC BLOOD PRESSURE: 132 MMHG | OXYGEN SATURATION: 95 % | BODY MASS INDEX: 17.11 KG/M2 | WEIGHT: 93 LBS | HEART RATE: 74 BPM | DIASTOLIC BLOOD PRESSURE: 60 MMHG | TEMPERATURE: 99 F | HEIGHT: 62 IN

## 2017-08-07 DIAGNOSIS — I50.22 CHRONIC SYSTOLIC CONGESTIVE HEART FAILURE (HCC): ICD-10-CM

## 2017-08-07 DIAGNOSIS — I10 ESSENTIAL HYPERTENSION: ICD-10-CM

## 2017-08-07 DIAGNOSIS — I48.91 ATRIAL FIBRILLATION, CONTROLLED (HCC): ICD-10-CM

## 2017-08-07 DIAGNOSIS — S41.112D SKIN TEAR OF LEFT UPPER ARM WITHOUT COMPLICATION, SUBSEQUENT ENCOUNTER: Primary | ICD-10-CM

## 2017-08-07 PROBLEM — N18.4 CKD (CHRONIC KIDNEY DISEASE) STAGE 4, GFR 15-29 ML/MIN (HCC): Status: ACTIVE | Noted: 2017-08-07

## 2017-08-07 PROCEDURE — 99214 OFFICE O/P EST MOD 30 MIN: CPT | Performed by: FAMILY MEDICINE

## 2017-08-07 NOTE — PROGRESS NOTES
HPI:    Patient ID: Areli Avila is a 80year old female. Seen wed in ER  Skin tear L arm x 2  HPI    Review of Systems   Respiratory: Positive for shortness of breath (w/ activity). Negative for cough.     Cardiovascular: Positive for palpitations and le motion. Neck supple. Cardiovascular: Normal rate, normal heart sounds and intact distal pulses. Pulmonary/Chest: Effort normal and breath sounds normal.   Musculoskeletal: She exhibits edema (bilat lower ext). Skin: Skin is warm and dry.  No erythema

## 2017-08-18 ENCOUNTER — TELEPHONE (OUTPATIENT)
Dept: FAMILY MEDICINE CLINIC | Facility: CLINIC | Age: 82
End: 2017-08-18

## 2017-08-18 NOTE — TELEPHONE ENCOUNTER
----- Message from Earnest Forrest sent at 8/18/2017  1:19 PM CDT -----  Contact: JOHANNY  CALL SON BACK @ [phone]

## 2017-09-28 NOTE — TELEPHONE ENCOUNTER
Labs and ov with Dr Ruelas Clear due in [facility] of Nov.  Upon further review past due for TSH w/FREE T4. SCHEDULED FOR TOMORROW AND ALSO WITH DR Xuan Roberts TO CHECK ARM/SKIN TEAR.

## 2017-09-29 NOTE — PROGRESS NOTES
HPI:    Patient ID: Haylie Sender is a 80year old female. Status post skin tear left forearm. Concerned with nodular lesion. Dry skin. Breathing okay. Blood pressure stable. Without problems with medication. Walks with walker.   HPI    Review of Sys oriented to person, place, and time. She appears well-developed and well-nourished. Cardiovascular: Normal rate, normal heart sounds and intact distal pulses. Pulmonary/Chest: Effort normal and breath sounds normal. She has no wheezes.  She has no rale

## 2017-09-29 NOTE — PATIENT INSTRUCTIONS
History rising cream to forearm. Continue to observe skin lesion if not resolving recommend excision. Continue current medications. Regular exercise. Call with questions or problems.

## 2017-10-25 NOTE — TELEPHONE ENCOUNTER
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 Local MAC being used. Dr. Shon Young knows that this is short notice but is asking if Dr. Justin Talbert would be willing to write.   Clearance will need to b

## 2017-10-27 NOTE — TELEPHONE ENCOUNTER
Left Message - DR Tomás Mustafa IS AWARE OF DR Joaquin Gilliland SKIN CANCER REMOVAL AND GRAFT      By Lexis Borja RN

## 2017-10-27 NOTE — TELEPHONE ENCOUNTER
Ry Toro will be going to Dr Sky Bartholomew to have the bandages changed, she had another skin graft done.

## 2017-11-01 NOTE — TELEPHONE ENCOUNTER
11: 45am.  V/o Dr. Kevin Villarreal. Patient advised and verbalizes understanding. Appointment scheduled.   Future Appointments  Date Time Provider Arthur Esteban   11/1/2017 11:30 AM Jim Akbar DO [facility] [place]

## 2017-11-01 NOTE — PROGRESS NOTES
HPI:    Patient ID: Abdullahi Lemon is a 80year old female. Should not with concerns that being dilated on temple area. Worried about having a stroke. Complaints of fatigue. Shortness of breath with activity. Denies chest pain.   Recent surgery on left MG Oral Tablet Dispersible Take 81 mg by mouth daily.  Disp:  Rfl: 0     Allergies:  Bactrim [Sulfametho*    Other (See Comments)    Comment:Muscle weakness, fatigue, off balance, loss of             appetite  Penicillins                PHYSICAL EXAM:   Afua

## 2017-11-01 NOTE — TELEPHONE ENCOUNTER
SHE HAS HEAD PRESSURE AND HAS 2 \"BLUE VEINS STICKING OUT OF MY FOREHEAD\" AND THINKS SHE SHOULD SEE DR Underwood Fast THIS MORNING.  SHE HAS A  WITH HER UNTIL NOON TODAY

## 2017-11-29 NOTE — TELEPHONE ENCOUNTER
OCTAVIO IS HAVING CARDIAC SX, HARD TO BREATH, OFE WANTS TO SEND HER TO THE ER BUT WOULD LIKE TO SPEAK WITH A NURSE FIRST.

## 2017-11-29 NOTE — TELEPHONE ENCOUNTER
Had edema and crackles at base of lungs last week. Dr. Inessa Milan put patient on 3 day course of Lasix. Ordered labs for Friday, but wasn't in the office to address results. Different nurse assessed her. She is there to see her now.   2+ pitting

## 2017-12-19 NOTE — PROGRESS NOTES
Received fax from [facility] home healthcare  Orders signed by Dr. Estefania Briscoe. Original sent to scanning.

## 2017-12-22 NOTE — TELEPHONE ENCOUNTER
Omeprazole last refilled 9/23/17 #90/0 refills  Potassium last filled 11/24/17 #1350 ml/0 refills  Last OV 11/1/17

## 2018-01-01 ENCOUNTER — TELEPHONE (OUTPATIENT)
Dept: FAMILY MEDICINE CLINIC | Facility: CLINIC | Age: 83
End: 2018-01-01

## 2018-01-01 ENCOUNTER — OFFICE VISIT (OUTPATIENT)
Dept: FAMILY MEDICINE CLINIC | Facility: CLINIC | Age: 83
End: 2018-01-01

## 2018-01-01 ENCOUNTER — MED REC SCAN ONLY (OUTPATIENT)
Dept: FAMILY MEDICINE CLINIC | Facility: CLINIC | Age: 83
End: 2018-01-01

## 2018-01-01 ENCOUNTER — LAB ENCOUNTER (OUTPATIENT)
Dept: LAB | Age: 83
End: 2018-01-01
Attending: FAMILY MEDICINE
Payer: MEDICARE

## 2018-01-01 VITALS
HEART RATE: 64 BPM | DIASTOLIC BLOOD PRESSURE: 56 MMHG | BODY MASS INDEX: 15.51 KG/M2 | WEIGHT: 84.25 LBS | SYSTOLIC BLOOD PRESSURE: 128 MMHG | HEIGHT: 62 IN | TEMPERATURE: 100 F | OXYGEN SATURATION: 97 %

## 2018-01-01 VITALS
TEMPERATURE: 99 F | DIASTOLIC BLOOD PRESSURE: 70 MMHG | OXYGEN SATURATION: 99 % | SYSTOLIC BLOOD PRESSURE: 134 MMHG | HEART RATE: 62 BPM

## 2018-01-01 DIAGNOSIS — K21.9 GASTROESOPHAGEAL REFLUX DISEASE, ESOPHAGITIS PRESENCE NOT SPECIFIED: ICD-10-CM

## 2018-01-01 DIAGNOSIS — I50.20 SYSTOLIC CONGESTIVE HEART FAILURE, UNSPECIFIED HF CHRONICITY (HCC): ICD-10-CM

## 2018-01-01 DIAGNOSIS — N39.0 UTI (URINARY TRACT INFECTION), BACTERIAL: Primary | ICD-10-CM

## 2018-01-01 DIAGNOSIS — Z86.2 HISTORY OF ANEMIA: ICD-10-CM

## 2018-01-01 DIAGNOSIS — N18.30 CKD (CHRONIC KIDNEY DISEASE) STAGE 3, GFR 30-59 ML/MIN (HCC): ICD-10-CM

## 2018-01-01 DIAGNOSIS — F41.8 DEPRESSION WITH ANXIETY: ICD-10-CM

## 2018-01-01 DIAGNOSIS — R79.9 ABNORMAL BLOOD CHEMISTRY: ICD-10-CM

## 2018-01-01 DIAGNOSIS — K59.00 CONSTIPATION, UNSPECIFIED CONSTIPATION TYPE: ICD-10-CM

## 2018-01-01 DIAGNOSIS — E78.5 HYPERLIPIDEMIA, UNSPECIFIED HYPERLIPIDEMIA TYPE: ICD-10-CM

## 2018-01-01 DIAGNOSIS — E78.49 OTHER HYPERLIPIDEMIA: ICD-10-CM

## 2018-01-01 DIAGNOSIS — I48.91 ATRIAL FIBRILLATION, CONTROLLED (HCC): ICD-10-CM

## 2018-01-01 DIAGNOSIS — R73.9 HYPERGLYCEMIA: ICD-10-CM

## 2018-01-01 DIAGNOSIS — N39.0 RECURRENT UTI: Primary | ICD-10-CM

## 2018-01-01 DIAGNOSIS — I87.2 VENOUS INSUFFICIENCY OF BOTH LOWER EXTREMITIES: ICD-10-CM

## 2018-01-01 DIAGNOSIS — N18.4 CKD (CHRONIC KIDNEY DISEASE) STAGE 4, GFR 15-29 ML/MIN (HCC): ICD-10-CM

## 2018-01-01 DIAGNOSIS — I10 ESSENTIAL HYPERTENSION: ICD-10-CM

## 2018-01-01 DIAGNOSIS — R53.1 WEAKNESS: Primary | ICD-10-CM

## 2018-01-01 DIAGNOSIS — I50.20 SYSTOLIC CONGESTIVE HEART FAILURE, UNSPECIFIED CONGESTIVE HEART FAILURE CHRONICITY: Primary | ICD-10-CM

## 2018-01-01 DIAGNOSIS — E03.9 HYPOTHYROIDISM, UNSPECIFIED TYPE: ICD-10-CM

## 2018-01-01 DIAGNOSIS — Z79.899 ENCOUNTER FOR LONG-TERM (CURRENT) USE OF MEDICATIONS: Primary | ICD-10-CM

## 2018-01-01 DIAGNOSIS — I50.22 CHRONIC SYSTOLIC CONGESTIVE HEART FAILURE (HCC): ICD-10-CM

## 2018-01-01 DIAGNOSIS — A49.9 UTI (URINARY TRACT INFECTION), BACTERIAL: Primary | ICD-10-CM

## 2018-01-01 PROCEDURE — 82607 VITAMIN B-12: CPT

## 2018-01-01 PROCEDURE — 83036 HEMOGLOBIN GLYCOSYLATED A1C: CPT

## 2018-01-01 PROCEDURE — 99215 OFFICE O/P EST HI 40 MIN: CPT | Performed by: FAMILY MEDICINE

## 2018-01-01 PROCEDURE — 36415 COLL VENOUS BLD VENIPUNCTURE: CPT

## 2018-01-01 PROCEDURE — 82728 ASSAY OF FERRITIN: CPT

## 2018-01-01 PROCEDURE — 84443 ASSAY THYROID STIM HORMONE: CPT

## 2018-01-01 PROCEDURE — 80061 LIPID PANEL: CPT

## 2018-01-01 PROCEDURE — 80053 COMPREHEN METABOLIC PANEL: CPT

## 2018-01-01 PROCEDURE — 85025 COMPLETE CBC W/AUTO DIFF WBC: CPT

## 2018-01-01 RX ORDER — NITROFURANTOIN 25; 75 MG/1; MG/1
100 CAPSULE ORAL 2 TIMES DAILY
Qty: 20 CAPSULE | Refills: 0 | COMMUNITY
Start: 2018-01-01 | End: 2018-01-01

## 2018-01-01 RX ORDER — CEPHALEXIN 500 MG/1
500 CAPSULE ORAL 2 TIMES DAILY
Qty: 20 CAPSULE | Refills: 0 | COMMUNITY
Start: 2018-01-01 | End: 2018-01-01 | Stop reason: ALTCHOICE

## 2018-01-01 RX ORDER — CITALOPRAM 10 MG/1
TABLET ORAL
Qty: 30 TABLET | Refills: 2 | Status: SHIPPED | OUTPATIENT
Start: 2018-01-01 | End: 2018-01-01

## 2018-01-01 RX ORDER — AMOXICILLIN 250 MG
1 CAPSULE ORAL DAILY
Refills: 0 | COMMUNITY
Start: 2018-01-01

## 2018-01-01 RX ORDER — NITROFURANTOIN MACROCRYSTALS 50 MG/1
50 CAPSULE ORAL NIGHTLY
Refills: 0 | COMMUNITY
Start: 2018-01-01

## 2018-01-01 RX ORDER — CITALOPRAM 10 MG/1
TABLET ORAL
Qty: 30 TABLET | Refills: 2 | Status: SHIPPED | OUTPATIENT
Start: 2018-01-01

## 2018-01-01 RX ORDER — POLYETHYLENE GLYCOL 3350 17 G/17G
17 POWDER, FOR SOLUTION ORAL DAILY
Refills: 0 | COMMUNITY
Start: 2018-01-01

## 2018-01-01 RX ORDER — MULTIVIT-MIN/FOLIC ACID/LUTEIN 400-250MCG
1 TABLET,CHEWABLE ORAL DAILY
Refills: 0 | COMMUNITY
Start: 2018-01-01

## 2018-01-01 RX ORDER — GUAIFENESIN 600 MG
600 TABLET, EXTENDED RELEASE 12 HR ORAL EVERY 12 HOURS PRN
Refills: 0 | COMMUNITY
Start: 2018-01-01

## 2018-01-01 RX ORDER — CITALOPRAM 10 MG/1
TABLET ORAL
Qty: 30 TABLET | Refills: 2 | Status: SHIPPED
Start: 2018-01-01 | End: 2018-01-01

## 2018-01-01 RX ORDER — METOLAZONE 2.5 MG/1
TABLET ORAL
Refills: 3 | COMMUNITY
Start: 2018-01-01

## 2018-01-01 RX ORDER — ARIPIPRAZOLE 15 MG/1
20 TABLET ORAL DAILY
Refills: 0 | COMMUNITY
Start: 2018-01-01

## 2018-01-01 RX ORDER — CITALOPRAM 10 MG/1
10 TABLET ORAL DAILY
Qty: 30 TABLET | Refills: 2 | Status: SHIPPED | OUTPATIENT
Start: 2018-01-01 | End: 2018-01-01

## 2018-01-01 RX ORDER — ALPRAZOLAM 0.25 MG/1
TABLET ORAL
Qty: 60 TABLET | Refills: 1 | COMMUNITY
Start: 2018-01-01

## 2018-01-01 RX ORDER — AMLODIPINE BESYLATE 10 MG/1
TABLET ORAL
Qty: 90 TABLET | Refills: 0 | Status: SHIPPED | OUTPATIENT
Start: 2018-01-01 | End: 2018-01-01

## 2018-01-01 RX ORDER — ATORVASTATIN CALCIUM 40 MG/1
TABLET, FILM COATED ORAL
Qty: 90 TABLET | Refills: 0 | Status: SHIPPED | OUTPATIENT
Start: 2018-01-01

## 2018-01-01 RX ORDER — ALPRAZOLAM 0.25 MG/1
0.25 TABLET ORAL EVERY 6 HOURS PRN
Qty: 30 TABLET | Refills: 0 | COMMUNITY
Start: 2018-01-01

## 2018-01-06 NOTE — TELEPHONE ENCOUNTER
Atorvastatin 10/10/17 #90x0  Amlodipine 10/10/17 #90x0  Last ov 11/1/17   130/64  Last labs 11/1/17 re ck 4 months    No future appointments.

## 2018-02-05 NOTE — TELEPHONE ENCOUNTER
DISCUSSED CURRENT STATUS, TOTALLY DEAF NOW,  DEPRESSED, LUL ASSISTED LIVING IS WHERE SHE WILL BE GOING. SHE IS ON O2 CONTINUOUSLY. DAUGHTER ASKS IF DR Marcelino Beyer WILL FILL OUT HER LA PAPERWORK AGAIN. SHE WILL BRING IT WITH HER AT MOM'S APPT.   VIANCA/CJ

## 2018-02-05 NOTE — TELEPHONE ENCOUNTER
TRANSFERING FROM [place] TO A NURSING HOME, THEY AREN'T SURE WHICH ONE THEY ARE MOVING HER TO, AYAD WOULD LIKE TO SPEAK TO Khalida Chu. ASSISTED LIVING WOULD BE THE BEST FOR HER, SHE IS HAVING MEMORY ISSUES SINCE SHE WENT TO REHAB.   [address]

## 2018-02-08 NOTE — PATIENT INSTRUCTIONS
Medication as directed. Recommend nursing home placement. Continue to follow with cardiology as directed. Follow up with me in 1 month.

## 2018-02-08 NOTE — PROGRESS NOTES
HPI:    Patient ID: Cally Beckman is a 80year old female. Recent hospitalization for congestive heart failure, frequent urinary tract infections, weakness. Has been at nursing home. Presents in wheelchair with daughter.   Complaints of fatigue, weakness Tab Takes 1 tab po daily  Disp:  Rfl: 3   Aspirin 81 MG Oral Tablet Dispersible Take 81 mg by mouth daily.  Disp:  Rfl: 0     Allergies:  Bactrim [Sulfametho*    Other (See Comments)    Comment:Muscle weakness, fatigue, off balance, loss of             appe

## 2018-02-15 NOTE — TELEPHONE ENCOUNTER
Margarito Call talked to her yesterday about this. She sent request for UA yesterday. Urinary frequency. Increased confusion. No fever. Advised no request received. OK for UA. V/o Dr. Mable Gomez. Left message advising Malcolm Day this has been taken care of.

## 2018-02-15 NOTE — TELEPHONE ENCOUNTER
FREQUENT URINATION, PAINFUL, NOT ACTING HERSELF. DAUGHTER CONCERNED ABOUT ANOTHER UTI. [facility] WAS SUPPOSED TO CALL.    PLEASE ADVISE

## 2018-02-16 NOTE — TELEPHONE ENCOUNTER
Yes, per Dr Sandie Welsh may have occasional wine/alcohol. Also, may give AZO max strength  2 tabs TID with meals for 2 days.   Urine has been sent for UA and C&S.  katie/cj

## 2018-02-17 NOTE — TELEPHONE ENCOUNTER
After reviewing Ua, keflex 500 mg po BID X 10 days ordered. Called to [facility] Dr Estefania Briscoe is aware of PCN  And Bactrim allergy.   ej/cj

## 2018-02-19 NOTE — TELEPHONE ENCOUNTER
ORDERS CALLED TO ED, SPOKE  [facility]. START MACROBID 100 MG PO BID X 10 DAYS.   EJ/CJ

## 2018-02-21 NOTE — TELEPHONE ENCOUNTER
PT HAS UTI, ON MEDS, NIGHT SHIFT REPORTS THAT PT HAS EXTREME URINARY FREQUENCY, DOES DR WANT TO PRESCRIBE SOMETHING TO HELP WITH BLADDER CONTROL?

## 2018-02-21 NOTE — TELEPHONE ENCOUNTER
Frequent urination. Up every few minutes during the night. Already on antibiotic for UTI. Was not susceptible to Keflex so she was changed to Macrobid 2 days ago, but this does not seem to be helping.

## 2018-02-21 NOTE — TELEPHONE ENCOUNTER
Fax request received from [address] [place] [address] we have an order for Nuris Edward to have a glass of wine daily? \"    Response faxed back:  OK to have glass of wine daily.   Dr. Henrik Saini

## 2018-03-14 NOTE — TELEPHONE ENCOUNTER
She just faxed a DNR and she needs it right away, the state is there reviewing Margarito Call.  Fax# [phone]

## 2018-03-15 NOTE — TELEPHONE ENCOUNTER
ORDER TO BRITTNI; SHE HAS CALL INTO DR THORNTON; ASKS IF HE ORDERS BID??  TOLD HER TO DO HIS ORDER.   DR Morgan Blanchard MADE AWARE

## 2018-03-15 NOTE — TELEPHONE ENCOUNTER
PT SAW DR Mc Has, HE WOULD LIKE HER TO BE ON PROBIOTIC, NEED DR Morgan Blanchard TO PRESCRIBE, PT IS @ [facility]

## 2018-03-15 NOTE — TELEPHONE ENCOUNTER
[facility] HAS PUT HER ON MACRODANTIN DAILYFOR 90 DAYS; AND HE DID ORDER TO START PROBIOTICS. [facility] HAS A HOUSE PROBIOTIC; WOULD YOU RECOMMEND DAILY?   ALSO, I TOLD HER TO CALL DR Angela Dewey

## 2018-03-20 NOTE — TELEPHONE ENCOUNTER
Claudia's family is requesting Xanax to be given prior to her appointments because she gets \"worked up\".

## 2018-04-04 NOTE — TELEPHONE ENCOUNTER
Per Molly the pt almost fell and scrapped the 3rd knuck on her R hand. Molly states they steri-stripped it. Pt did bump her R shoulder but isn't complaining of any pain. Pt state that is her bad arm and she hasn't been able to lift that arm up.  Molly doesn't fee

## 2018-04-04 NOTE — TELEPHONE ENCOUNTER
OCTAVIO ALMOST FELL, SHE CAUGHTER HERSELF, SHE WAS GOING TO THE DINING ROOM FOR LUNCH, SHE HAS A SKIN TEAR AND BUMPED HER RIGHT SHOULDER.

## 2018-04-12 NOTE — PATIENT INSTRUCTIONS
PT eval / Tx - ROM / strengthening  Ace wraps daily feet to knees  Elevate legs  f/u cardiology as directed  meds as directed  Call w/ lab

## 2018-04-12 NOTE — TELEPHONE ENCOUNTER
IN THE PAST, PATIENT GOT A BAD SKIN TEAR ON LEG DUE TO WEARING JIAN HOSE; DAUGHTER REFUSES JIAN HOSE. TOOK 2 MONTHS TO HEAL SKIN TEAR. Reported to Dr Suki Stanton. Recommend lower legs to be ace wrapped daily from feet to ankles, remove at bedtime.   Physic

## 2018-04-12 NOTE — PROGRESS NOTES
HPI:    Patient ID: Gilles Pedraza is a 80year old female. c/o weakness  Difficulty w/ standing off + on  w/o trauma  w/o support stockings  Breathing OK  Swelling lower legs/feet  HPI    Review of Systems   Constitutional: Negative for chills and fever. Multiple Vitamins-Minerals (CENTRUM ADULTS) Oral Tab Take 1 tablet by mouth daily.  Centrum Complete Multivitamin tab- take 1 daily Disp: 90 tablet Rfl: 3   Calcium Carb-Cholecalciferol (CALTRATE 600+D) 600-800 MG-UNIT Oral Tab Take 1 tablet by mouth 2 (t PANEL (14); Future  - CBC WITH DIFFERENTIAL WITH PLATELET; Future  - HEMOGLOBIN A1C; Future  - FERRITIN; Future  - VITAMIN B12; Future    2. Essential hypertension  Monitor blood pressure. Continue current care.   - ASSAY, THYROID STIM HORMONE; Future  - C directed.     Orders Placed This Encounter      TSH      Comp Metabolic Panel (14) [E]      CBC W Differential W Platelet [E]      HGB A1C      Ferritin [E]      Vitamin B12 [E]    Meds This Visit:  No prescriptions requested or ordered in this encounter

## 2018-05-11 NOTE — TELEPHONE ENCOUNTER
RESIDENT RECEIVED A SKIN TEAR 8 CM TO HER RIGHT ARM DURING CARE. WOUND CLEANSED, STERISTRIPED, ABD APPLIED AND WRAPPED WITH KERLIX. PER SHEREEN. ADVISED- AS ABOVE- FORM FAXED BACK TO [address] [place] [address].   VIANCA/CORTES

## 2018-05-30 NOTE — TELEPHONE ENCOUNTER
Molly from [facility] [place] states they received magnesium level from 5/8/18 which was 1.9. Molly is wanting to know if Dr. Catrachito Gerardo wanted to make any changes, or should the patient just keep doing what she is doing.

## 2018-05-30 NOTE — TELEPHONE ENCOUNTER
Magnesium level was received from [facility] from May. Molly wants to know if doctor wants to do anything about that or just leave it where it is?

## 2018-06-04 NOTE — TELEPHONE ENCOUNTER
RECEIVED FAX THAT OCTAVIO HAS NEW SPOTS ON HER FOREHEAD, FEELS LIKE THEY CAME OVERNIGHT. LOOKS LIKE AGE SPOTS TO THE NURSE. SHE HAS SOME ON THE SIDE OF FACE THAT ARE RAISED. ASKING IF TO SEE DERM?   ADVISED- YES, CHECK WITH OCTAVIO OR FAMILY AS TO WHO SHE JENNINGS

## 2018-06-06 NOTE — TELEPHONE ENCOUNTER
Per Dr. Justin Talbert the pt should be following up with Derm. Molly aware and v/u.  She will get the pt in with [facility] since the pt doesn't want to go up to [address] [place] [address]

## 2018-06-11 NOTE — TELEPHONE ENCOUNTER
FAXED RECEIVED NOTIFYING DR Flori Hampton THAT OCTAVIO HAS A SMALL SKIN TEAR TO RIGHT FOREARM. CLEANSED ET STERI-STRIPED ; WILL CONTINUE TO MONITOR. DR Flori Hampton APPROVED PLAN OF CARE AND FAXED BACK TO [facility].    EJ/CJ

## 2018-06-25 NOTE — TELEPHONE ENCOUNTER
Jana Perez is bleeding vaginally for last day and 1/2. It was quite a bit in toilet when urinating, but now has tapered off, just a small amount. No pain or unaware it is happening until she see's in the toilet bowl.   Advised- called to [facility], that patient ne

## 2018-07-11 NOTE — TELEPHONE ENCOUNTER
Contacted [address] [place] [address] and spoke with LUDY Barnes. Asked regarding wound evaluation/. She states Dr. Garzon Dubach rounds on the patients on Wednesdays but was not there today.  Reports she will write an order for the wound to be evaluated the next time he is at [facility]

## 2018-07-11 NOTE — TELEPHONE ENCOUNTER
Called [address] [place] [address] and spoke with LUDY Barnes. She states this morning she noted an open area on the left ischium, can not see the wound base, pale coating over the top of it. Requesting an order for a hydrocolloid dressing apply every 3 days and PRN.  New cush

## 2018-07-11 NOTE — TELEPHONE ENCOUNTER
If she has a decubitous ulcer that is deeper that skin then she needs a wound consult. Can they do that there?

## 2018-07-19 NOTE — TELEPHONE ENCOUNTER
Spoke with LUDY Barnes at [facility]. Reports pt had a UA performed and started on Macrobid 100mg twice daily x 10 days by Dr Aniyah Page and will repeat ua after antibiotic treatment. Molly just wanted to update. No orders needed.

## 2018-07-19 NOTE — TELEPHONE ENCOUNTER
PT DID REPEAT URINALYSIS PER DR THORNTON, GOT RESULTS, HE PUT HER ON ANTIBIOTIC AGAIN, CALL BRITTNI @ [facility]

## 2018-07-25 NOTE — TELEPHONE ENCOUNTER
Fax received reporting Luis F Turcios has gotten a skin tear, moving around in bed. Luis F Turcios says she caught it on the side rail. The staff cleansed, steri-stripped and covered the wound for protection. Her skin is extremely thin and only on 81 mg ASA for blood thinnin

## 2018-08-09 NOTE — TELEPHONE ENCOUNTER
Per nurse at [address]- sometimes in morning has phlegm in back of throat and has difficulty getting it up. Asks for Mucinex PRN order. Routed to Dr Manisha Pinto.

## 2018-09-01 ENCOUNTER — MED REC SCAN ONLY (OUTPATIENT)
Dept: FAMILY MEDICINE CLINIC | Facility: CLINIC | Age: 83
End: 2018-09-01

## (undated) NOTE — MR AVS SNAPSHOT
[place] [address] [facility] [address] [phone]               Thank you for choosing us for your health care visit with Suresh Ferguson DO.   We are glad to serve you and happy to provide you with this summary of Take one tablet by mouth every 6 hours as needed for anxiety   Commonly known as:  XANAX           Amiodarone HCl 100 MG Tabs   Take 1 tablet (100 mg total) by mouth daily.    Commonly known as:  PACERONE           AmLODIPine Besylate 10 MG Tabs   Take 1 ta EAT THESE FOODS MORE OFTEN: EAT THESE FOODS LESS OFTEN:   Make half your plate fruits and vegetables Highly refined, white starches including white bread, rice and pasta   Eat plenty of protein, keep the fat content low Sugars:  sodas and sports drinks, ca

## (undated) NOTE — Clinical Note
05/25/2017        [address]  [facility] [address]      Dear Debby Bain,    [address] [facility] records indicate that you have lab work and or testing that was ordered for you and has not yet been completed:  Lab Frequency Next Occurrence   MICROALB/

## (undated) NOTE — MR AVS SNAPSHOT
[facility]  [address] [facility] [address]  [phone]               Thank you for choosing us for your health care visit with Anatoliy Burciaga DO.   We are glad to serve you and happy to provide you with this summary of AmLODIPine Besylate 10 MG Tabs   Take 1 tablet (10 mg total) by mouth daily. Commonly known as:  NORVASC           Aspirin 81 MG Tbdp   Take 81 mg by mouth daily.            Atorvastatin Calcium 40 MG Tabs   Take 1 tablet (40 mg total) by mouth once jeanette

## (undated) NOTE — LETTER
[facility], [address] [place] [address]  [phone]             10/26/2017    OCTAVIO JAQUEZ  -1929      TO WHOM IT MAY CONCERN,          OCTAVIO JAQUEZ HAS CLEARANCE FOR PROCEDURE, E

## (undated) NOTE — MR AVS SNAPSHOT
[facility]  [address] [facility] [address]  [phone]               Thank you for choosing us for your health care visit with Vinicius Serra DO.   We are glad to serve you and happy to provide you with this summary of AmLODIPine Besylate 10 MG Tabs   Take 1 tablet (10 mg total) by mouth daily. Commonly known as:  NORVASC           Aspirin 81 MG Tbdp   Take 81 mg by mouth daily.            Atorvastatin Calcium 40 MG Tabs   Take 1 tablet (40 mg total) by mouth once jeanette